# Patient Record
Sex: FEMALE | URBAN - METROPOLITAN AREA
[De-identification: names, ages, dates, MRNs, and addresses within clinical notes are randomized per-mention and may not be internally consistent; named-entity substitution may affect disease eponyms.]

---

## 2020-06-17 ENCOUNTER — CLAIMS CREATED FROM THE CLAIM WINDOW (OUTPATIENT)
Dept: URBAN - METROPOLITAN AREA MEDICAL CENTER 28 | Facility: MEDICAL CENTER | Age: 55
End: 2020-06-17

## 2020-06-17 ENCOUNTER — CLAIMS CREATED FROM THE CLAIM WINDOW (OUTPATIENT)
Dept: URBAN - METROPOLITAN AREA MEDICAL CENTER 28 | Facility: MEDICAL CENTER | Age: 55
End: 2020-06-17
Payer: COMMERCIAL

## 2020-06-17 DIAGNOSIS — K22.8 COLUMNAR-LINED ESOPHAGUS: ICD-10-CM

## 2020-06-17 DIAGNOSIS — K31.89 ACQUIRED DEFORMITY OF PYLORUS: ICD-10-CM

## 2020-06-17 DIAGNOSIS — R10.13 ABDOMINAL DISCOMFORT, EPIGASTRIC: ICD-10-CM

## 2020-06-17 DIAGNOSIS — K29.60 ADENOPAPILLOMATOSIS GASTRICA: ICD-10-CM

## 2020-06-17 PROCEDURE — 43259 EGD US EXAM DUODENUM/JEJUNUM: CPT | Performed by: INTERNAL MEDICINE

## 2020-06-17 PROCEDURE — 43239 EGD BIOPSY SINGLE/MULTIPLE: CPT | Performed by: INTERNAL MEDICINE

## 2020-11-23 ENCOUNTER — TELEPHONE ENCOUNTER (OUTPATIENT)
Dept: URBAN - METROPOLITAN AREA CLINIC 98 | Facility: CLINIC | Age: 55
End: 2020-11-23

## 2021-01-07 ENCOUNTER — OFFICE VISIT (OUTPATIENT)
Dept: URBAN - METROPOLITAN AREA CLINIC 98 | Facility: CLINIC | Age: 56
End: 2021-01-07

## 2021-01-07 VITALS — HEIGHT: 67 IN

## 2021-07-23 ENCOUNTER — WEB ENCOUNTER (OUTPATIENT)
Dept: URBAN - METROPOLITAN AREA CLINIC 98 | Facility: CLINIC | Age: 56
End: 2021-07-23

## 2021-07-23 ENCOUNTER — OFFICE VISIT (OUTPATIENT)
Dept: URBAN - METROPOLITAN AREA CLINIC 98 | Facility: CLINIC | Age: 56
End: 2021-07-23
Payer: COMMERCIAL

## 2021-07-23 VITALS
HEART RATE: 92 BPM | TEMPERATURE: 97.1 F | HEIGHT: 67 IN | DIASTOLIC BLOOD PRESSURE: 75 MMHG | SYSTOLIC BLOOD PRESSURE: 115 MMHG | WEIGHT: 132.8 LBS | BODY MASS INDEX: 20.84 KG/M2

## 2021-07-23 DIAGNOSIS — K21.00 CHRONIC REFLUX ESOPHAGITIS: ICD-10-CM

## 2021-07-23 DIAGNOSIS — Z86.010 PERSONAL HISTORY OF COLONIC POLYPS: ICD-10-CM

## 2021-07-23 PROCEDURE — 99214 OFFICE O/P EST MOD 30 MIN: CPT | Performed by: INTERNAL MEDICINE

## 2021-07-23 RX ORDER — UPADACITINIB 15 MG/1
1 TABLET TABLET, EXTENDED RELEASE ORAL ONCE A DAY
Status: ACTIVE | COMMUNITY

## 2021-07-23 RX ORDER — SODIUM, POTASSIUM,MAG SULFATES 17.5-3.13G
17.5-13.3-1.6 GM/177ML SOLUTION, RECONSTITUTED, ORAL ORAL
Qty: 1 BOX | Refills: 0 | OUTPATIENT
Start: 2021-07-23 | End: 2021-07-24

## 2021-07-23 NOTE — HPI-TODAY'S VISIT:
Last colon in 2018 and was normal. History of colon polyps.  EUS in 6/20 and was normal.  No CIBH or bleeding.  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  No CIBH or bleeding.

## 2021-07-27 PROBLEM — 428283002: Status: ACTIVE | Noted: 2021-07-23

## 2021-09-13 ENCOUNTER — OFFICE VISIT (OUTPATIENT)
Dept: URBAN - METROPOLITAN AREA SURGERY CENTER 18 | Facility: SURGERY CENTER | Age: 56
End: 2021-09-13
Payer: COMMERCIAL

## 2021-09-13 DIAGNOSIS — Z80.0 BROTHER AT YOUNG AGE FAMILY HISTORY OF COLON CANCER: ICD-10-CM

## 2021-09-13 DIAGNOSIS — Z86.010 ADENOMAS PERSONAL HISTORY OF COLONIC POLYPS: ICD-10-CM

## 2021-09-13 PROCEDURE — G8907 PT DOC NO EVENTS ON DISCHARG: HCPCS | Performed by: INTERNAL MEDICINE

## 2021-09-13 PROCEDURE — 45378 DIAGNOSTIC COLONOSCOPY: CPT | Performed by: INTERNAL MEDICINE

## 2021-09-13 RX ORDER — UPADACITINIB 15 MG/1
1 TABLET TABLET, EXTENDED RELEASE ORAL ONCE A DAY
Status: ACTIVE | COMMUNITY

## 2023-06-27 ENCOUNTER — LAB OUTSIDE AN ENCOUNTER (OUTPATIENT)
Dept: URBAN - METROPOLITAN AREA CLINIC 98 | Facility: CLINIC | Age: 58
End: 2023-06-27

## 2023-06-27 ENCOUNTER — OFFICE VISIT (OUTPATIENT)
Dept: URBAN - METROPOLITAN AREA CLINIC 98 | Facility: CLINIC | Age: 58
End: 2023-06-27
Payer: COMMERCIAL

## 2023-06-27 ENCOUNTER — WEB ENCOUNTER (OUTPATIENT)
Dept: URBAN - METROPOLITAN AREA CLINIC 98 | Facility: CLINIC | Age: 58
End: 2023-06-27

## 2023-06-27 VITALS
WEIGHT: 134 LBS | HEIGHT: 67 IN | BODY MASS INDEX: 21.03 KG/M2 | DIASTOLIC BLOOD PRESSURE: 65 MMHG | SYSTOLIC BLOOD PRESSURE: 99 MMHG | TEMPERATURE: 97.8 F | HEART RATE: 85 BPM

## 2023-06-27 DIAGNOSIS — R10.13 EPIGASTRIC PAIN: ICD-10-CM

## 2023-06-27 DIAGNOSIS — Z80.0 FAMILY HISTORY OF COLON CANCER: ICD-10-CM

## 2023-06-27 DIAGNOSIS — Z15.09 BIALLELIC MUTATION OF MSH6 GENE: ICD-10-CM

## 2023-06-27 PROCEDURE — 99214 OFFICE O/P EST MOD 30 MIN: CPT | Performed by: INTERNAL MEDICINE

## 2023-06-27 RX ORDER — POLYETHYLENE GLYCOL 3350, SODIUM CHLORIDE, SODIUM BICARBONATE, POTASSIUM CHLORIDE 420; 11.2; 5.72; 1.48 G/4L; G/4L; G/4L; G/4L
AS DIRECTED POWDER, FOR SOLUTION ORAL ONCE
Qty: 420 GM | Refills: 0 | OUTPATIENT
Start: 2023-06-27 | End: 2023-06-28

## 2023-06-27 NOTE — HPI-TODAY'S VISIT:
Ms. Thalia Biggs is a 56 yo F presenting for followup visit for abdominal pain. Carries MSH6 variant.   Last saw Dr. Hunt 7/23/2021 Having every 1-2 year colonoscopy and every 2-4 year EGD for MSH6 variant and family history of GI cancer. In the last 3 weeks she was having a stomachache with eating. Feels more epigastric pain and fullness.  No nausea or vomiting. These symptoms have starte to improve in the last 2 days.  Intermittently losing voice- going to seen ENT for this.   No known unintentional weight loss.   Has BMs daily or every other day.   Sister- colon cancer- age 50s Father: pancreatic cancer age 83   I reviewed:  9/13/2021 colonoscopy: hemorrhoids 6/17/2020 EUS for epigastric pain, chronic pancreatitis: normal-appearing pancreas, hiatal hernia, erythema in stomach/duodenum- biopsied 6/17/2020 EUS path: mild chronic inflammation

## 2023-07-03 ENCOUNTER — OFFICE VISIT (OUTPATIENT)
Dept: URBAN - METROPOLITAN AREA SURGERY CENTER 18 | Facility: SURGERY CENTER | Age: 58
End: 2023-07-03
Payer: COMMERCIAL

## 2023-07-03 ENCOUNTER — CLAIMS CREATED FROM THE CLAIM WINDOW (OUTPATIENT)
Dept: URBAN - METROPOLITAN AREA CLINIC 4 | Facility: CLINIC | Age: 58
End: 2023-07-03
Payer: COMMERCIAL

## 2023-07-03 DIAGNOSIS — K31.89 OTHER DISEASES OF STOMACH AND DUODENUM: ICD-10-CM

## 2023-07-03 DIAGNOSIS — Z12.11 COLON CANCER SCREENING: ICD-10-CM

## 2023-07-03 DIAGNOSIS — Z80.0 BROTHER AT YOUNG AGE FAMILY HISTORY OF COLON CANCER: ICD-10-CM

## 2023-07-03 DIAGNOSIS — K29.60 ADENOPAPILLOMATOSIS GASTRICA: ICD-10-CM

## 2023-07-03 PROCEDURE — G8907 PT DOC NO EVENTS ON DISCHARG: HCPCS | Performed by: INTERNAL MEDICINE

## 2023-07-03 PROCEDURE — 88342 IMHCHEM/IMCYTCHM 1ST ANTB: CPT | Performed by: PATHOLOGY

## 2023-07-03 PROCEDURE — 45378 DIAGNOSTIC COLONOSCOPY: CPT | Performed by: INTERNAL MEDICINE

## 2023-07-03 PROCEDURE — 88305 TISSUE EXAM BY PATHOLOGIST: CPT | Performed by: PATHOLOGY

## 2023-07-03 PROCEDURE — 43239 EGD BIOPSY SINGLE/MULTIPLE: CPT | Performed by: INTERNAL MEDICINE

## 2023-08-11 ENCOUNTER — OFFICE VISIT (OUTPATIENT)
Dept: URBAN - METROPOLITAN AREA CLINIC 98 | Facility: CLINIC | Age: 58
End: 2023-08-11

## 2023-08-24 PROBLEM — 151271000119102: Status: ACTIVE | Noted: 2023-08-24

## 2023-08-24 PROBLEM — 707724006: Status: ACTIVE | Noted: 2023-08-24

## 2023-08-24 PROBLEM — 443913008: Status: ACTIVE | Noted: 2023-08-24

## 2023-08-24 PROBLEM — 69896004: Status: ACTIVE | Noted: 2023-08-24

## 2023-08-24 PROBLEM — 453861000124107: Status: ACTIVE | Noted: 2023-08-24

## 2023-08-25 ENCOUNTER — OFFICE VISIT (OUTPATIENT)
Dept: URBAN - METROPOLITAN AREA CLINIC 86 | Facility: CLINIC | Age: 58
End: 2023-08-25
Payer: COMMERCIAL

## 2023-08-25 VITALS
HEART RATE: 90 BPM | BODY MASS INDEX: 21.35 KG/M2 | WEIGHT: 136 LBS | DIASTOLIC BLOOD PRESSURE: 62 MMHG | TEMPERATURE: 98.2 F | SYSTOLIC BLOOD PRESSURE: 114 MMHG | HEIGHT: 67 IN

## 2023-08-25 DIAGNOSIS — R76.9 ABNORMAL IMMUNOLOGICAL FINDING IN SERUM: ICD-10-CM

## 2023-08-25 DIAGNOSIS — Z71.85 VACCINE COUNSELING: ICD-10-CM

## 2023-08-25 DIAGNOSIS — Z79.899 HIGH RISK MEDICATION USE: ICD-10-CM

## 2023-08-25 DIAGNOSIS — M06.9 RHEUMATOID ARTHRITIS, INVOLVING UNSPECIFIED SITE, UNSPECIFIED WHETHER RHEUMATOID FACTOR PRESENT: ICD-10-CM

## 2023-08-25 DIAGNOSIS — R74.8 ELEVATED LIVER ENZYMES: ICD-10-CM

## 2023-08-25 PROCEDURE — 99204 OFFICE O/P NEW MOD 45 MIN: CPT | Performed by: PHYSICIAN ASSISTANT

## 2023-08-25 RX ORDER — RITUXIMAB 10 MG/ML
AS DIRECTED INJECTION, SOLUTION INTRAVENOUS
Status: ACTIVE | COMMUNITY

## 2023-08-25 RX ORDER — PREDNISOLONE 5 MG/1
1 TABLET IN THE MORNING WITH FOOD OR MILK TABLET ORAL ONCE A DAY
Status: ACTIVE | COMMUNITY

## 2023-08-25 NOTE — HPI-TODAY'S VISIT:
This is a 38-year-old female referred by Dr. Zamora for evaluation of elevated liver enzymes. A ocpy of the note will be sent to the referring provider  She is seeing Dr. Lujan for treatment of her rheumatoid arthritis.  She started Cimzia in 2022.  Now with AMA ASMA positive and elevated liver enzymes. She is also on steriods. previously has tried methotrexate, plaquenil, xeljanz, ocrencia, enbrel and humira  Most recent July 21 labs with bilirubin 0.3, alkaline phosphatase 67, AST 20, ALT 14.  Creatinine 0.71.  White blood cells 3.5, hemoglobin 11.4, platelets 278.  Previous in jan 2023 bili 0.2, alp 58, ast 29, alt  They were concerned with her medications causing issues.  Certolizumab has been associated with a low rate of serum aminotransferase elevations during therapy, similar to the rate found with placebo therapy. The ALT elevations have been transient, mild and asymptomatic, and have rarely required dose modification. Certolizumab has been available for a relatively short period of time, and case reports of clinically apparent hepatic injury due to its use have not been published. Nevertheless, it is likely that certolizumab, like infliximab and adalimumab, is capable of inducing clinically apparent liver injury that resembles autoimmune hepatitis, which generally arises after at least 3 months of use and is associated with a hepatocellular pattern of serum enzyme elevation and autoantibody formation. Autoimmune hepatitis induced by anti-TNF blocking agents can be severe and self-sustained and require corticosteroid therapy.  Certolizumab, like other TNF antagonists, can also be expected to cause reactivation of chronic hepatitis B. Reactivation typically occurs in patients who are inactive HBsAg carriers, with normal serum aminotransferase levels and no or only low levels of HBV DNA in serum. The immune suppression caused by the immunomodulatory agent leads to an increase in HBV replication and rise in serum HBV DNA levels. With stopping the immunosuppression (or between cycles of therapy), restoration of immune function leads to an acute immunological response to the heightened viral replication and a flare of hepatitis, that can be severe and can result in hepatic failure and death. Reactivation in patients with anti-HBc without HBsAg (serologic pattern of previous HBV infection) has been reported only rarely in patients treated with anti-TNF antagonists, and is more common after therapy with rituximab and bone marrow transplantation. The anti-TNF inhibitors have little or no effect on hepatitis C virus levels and have been used safely in patients with chronic hepatitis C.

## 2023-09-06 ENCOUNTER — OFFICE VISIT (OUTPATIENT)
Dept: URBAN - METROPOLITAN AREA CLINIC 79 | Facility: CLINIC | Age: 58
End: 2023-09-06

## 2023-09-08 ENCOUNTER — OFFICE VISIT (OUTPATIENT)
Dept: URBAN - METROPOLITAN AREA CLINIC 91 | Facility: CLINIC | Age: 58
End: 2023-09-08
Payer: COMMERCIAL

## 2023-09-08 DIAGNOSIS — R93.2 ABNORMAL ULTRASOUND OF LIVER: ICD-10-CM

## 2023-09-08 PROCEDURE — 76705 ECHO EXAM OF ABDOMEN: CPT

## 2023-09-08 PROCEDURE — 93975 VASCULAR STUDY: CPT

## 2023-09-11 ENCOUNTER — TELEPHONE ENCOUNTER (OUTPATIENT)
Dept: URBAN - METROPOLITAN AREA CLINIC 86 | Facility: CLINIC | Age: 58
End: 2023-09-11

## 2023-09-11 ENCOUNTER — LAB OUTSIDE AN ENCOUNTER (OUTPATIENT)
Dept: URBAN - METROPOLITAN AREA CLINIC 86 | Facility: CLINIC | Age: 58
End: 2023-09-11

## 2023-09-11 NOTE — HPI-TODAY'S VISIT:
Dear Chandrika Biggs,  The 9/8/23 ultrasound was sent to me.  The study did not see any gallstones.  Gallbladder normal no inflammation.  The common duct 3 mm.  The liver was mildly coarsened in echotexture but no focal abnormality or lesions seen.  Right kidney appears normal.  The spleen 6 cm and normal.  Hepatic vascular patent.  Overall they thought that the liver appeared somewhat fatty and this is likely due to your history of intermittently elevated liver enzymes and being on the steroids.  Treatment for this is diet and exercise.  We also may want to take a closer look at this given your BMI it is very normal and you do exercise and practice a healthy diet. We can proceed with an MRI to evaluate this. I can order one through [employer]. They should contact you to schedule this. If there are issues please contact our office at [phone] extension [phone] for my medical assistant, Betito.  Lulu Castro PA-C

## 2023-10-06 ENCOUNTER — LAB OUTSIDE AN ENCOUNTER (OUTPATIENT)
Dept: URBAN - METROPOLITAN AREA CLINIC 92 | Facility: CLINIC | Age: 58
End: 2023-10-06

## 2023-10-06 ENCOUNTER — WEB ENCOUNTER (OUTPATIENT)
Dept: URBAN - METROPOLITAN AREA CLINIC 86 | Facility: CLINIC | Age: 58
End: 2023-10-06

## 2023-10-06 ENCOUNTER — OFFICE VISIT (OUTPATIENT)
Dept: URBAN - METROPOLITAN AREA CLINIC 86 | Facility: CLINIC | Age: 58
End: 2023-10-06
Payer: COMMERCIAL

## 2023-10-06 DIAGNOSIS — R76.9 ABNORMAL IMMUNOLOGICAL FINDING IN SERUM: ICD-10-CM

## 2023-10-06 DIAGNOSIS — Z79.899 HIGH RISK MEDICATION USE: ICD-10-CM

## 2023-10-06 DIAGNOSIS — Z71.85 VACCINE COUNSELING: ICD-10-CM

## 2023-10-06 DIAGNOSIS — R74.8 ELEVATED LIVER ENZYMES: ICD-10-CM

## 2023-10-06 DIAGNOSIS — M06.9 RHEUMATOID ARTHRITIS, INVOLVING UNSPECIFIED SITE, UNSPECIFIED WHETHER RHEUMATOID FACTOR PRESENT: ICD-10-CM

## 2023-10-06 PROCEDURE — 99214 OFFICE O/P EST MOD 30 MIN: CPT | Performed by: PHYSICIAN ASSISTANT

## 2023-10-06 RX ORDER — RITUXIMAB 10 MG/ML
AS DIRECTED INJECTION, SOLUTION INTRAVENOUS
Status: ACTIVE | COMMUNITY

## 2023-10-06 RX ORDER — PREDNISOLONE 5 MG/1
1 TABLET IN THE MORNING WITH FOOD OR MILK TABLET ORAL ONCE A DAY
Status: ACTIVE | COMMUNITY

## 2023-10-06 NOTE — HPI-TODAY'S VISIT:
This is a 38-year-old female referred by Dr. Zamora for evaluation of elevated liver enzymes. A ocpy of the note will be sent to the referring provider    10/6/23 she did MRI EXAM: MR ABDOMEN WITH AND WITHOUT CONTRAST  CLINICAL INDICATION: Epigastric pain, elevated liver enzymes, abnormal ultrasound of abdomen.  TECHNIQUE: Multiplanar multisequence images were obtained through the abdomen without and with IV administration of 14 mL of Clariscan. Fat quantification was also performed.  COMPARISON: The report from an outside ultrasound examination dated September 8, 2023 is available. The images from the outside examination are not available at the time of interpretation.  FINDINGS:  Evaluation of the visualized lower chest demonstrates the heart is within normal limits in size. There is no significant sized pleural or pericardial effusion at the visualized lung bases. Please note the lungs are suboptimally evaluated with MRI.  The liver is within normal limits in size and the right hepatic lobe measures 12.5 cm in length. The liver demonstrates a smooth contour. There is no significant hepatic steatosis. There is no suspicious enhancing hepatic mass.  Fat quantification: The average liver fat content percentage calculated using a two-point Stevens technique is: 3 %, which is not compatible with fatty liver.  Reference values: =6% normal (Grade 0) > 6 to = 26% mild steatosis (Grade 1) > 27 to = 37% moderate steatosis (Grade 2) > 37% severe steatosis (Grade 3)   The gallbladder demonstrates T1 hyperintense material likely sludge and/or concentrated bile. There are no discrete filling defects to suggest gallstones. There is no intra or extrahepatic biliary dilatation.  The spleen is within normal limits in size and is unremarkable. The adrenal glands are unremarkable.  ----- Page Break ----- The pancreas is unremarkable. T1 signal intensity of the pancreas is within normal limits. There is no dilatation of the main pancreatic duct. No focal abnormality is identified within the pancreas. There is no pancreatic or peripancreatic fluid collection.  The kidneys enhance symmetrically. There is no hydronephrosis. There is a small T2 hyperintense cyst at the lower pole the right kidney measuring 0.7 cm (series 4 image 24) with a probable thin internal septation best seen on coronal post contrast images (series 24 image 34) likely a mildly complex Bosniak 2 renal cyst otherwise without definite postcontrast enhancement.  The abdominal aorta is patent without aneurysmal dilatation. The celiac, SMA and inferior mesenteric arteries are patent. The hepatic veins and portal veins are patent.  There is no evidence for mechanical intestinal obstruction of the visualized bowel. The bowel is not imaged in its entirety.  There is no abdominal ascites. There is no abdominal lymphadenopathy by size criteria.  No suspicious osseous lesion is identified.  IMPRESSION: 1. No significant hepatic steatosis. No suspicious enhancing hepatic mass is identified. 2. Subcentimeter right lower pole renal cyst most likely a Bosniak 2 renal cyst. No hydronephrosis. 3. No MRI evidence for acute pancreatitis or acute cholecystitis.  The 9/8/23 ultrasound was sent to me.  The study did not see any gallstones.  Gallbladder normal no inflammation.  The common duct 3 mm.  The liver was mildly coarsened in echotexture but no focal abnormality or lesions seen.  Right kidney appears normal.  The spleen 6 cm and normal.  Hepatic vascular patent.  Overall they thought that the liver appeared somewhat fatty and this is likely due to your history of intermittently elevated liver enzymes and being on the steroids.  Treatment for this is diet and exercise.  We also may want to take a closer look at this given your BMI it is very normal and you do exercise and practice a healthy diet. We can proceed with an MRI to evaluate this. I can order one through [email or web address]. They should contact you to schedule this. If there are issues please contact our office at [phone] extension [phone] for my medical assistant, Betito.  she did not do the labs so we will do the labs today  recap She is seeing Dr. Lujan for treatment of her rheumatoid arthritis.  She started Cimzia in 2022.  Now with AMA ASMA positive and elevated liver enzymes. She is also on steriods. previously has tried methotrexate, plaquenil, xeljanz, ocrencia, enbrel and humira  Most recent July 21 labs with bilirubin 0.3, alkaline phosphatase 67, AST 20, ALT 14.  Creatinine 0.71.  White blood cells 3.5, hemoglobin 11.4, platelets 278.  Previous in jan 2023 bili 0.2, alp 58, ast 29, alt  They were concerned with her medications causing issues.  Certolizumab has been associated with a low rate of serum aminotransferase elevations during therapy, similar to the rate found with placebo therapy. The ALT elevations have been transient, mild and asymptomatic, and have rarely required dose modification. Certolizumab has been available for a relatively short period of time, and case reports of clinically apparent hepatic injury due to its use have not been published. Nevertheless, it is likely that certolizumab, like infliximab and adalimumab, is capable of inducing clinically apparent liver injury that resembles autoimmune hepatitis, which generally arises after at least 3 months of use and is associated with a hepatocellular pattern of serum enzyme elevation and autoantibody formation. Autoimmune hepatitis induced by anti-TNF blocking agents can be severe and self-sustained and require corticosteroid therapy.  Certolizumab, like other TNF antagonists, can also be expected to cause reactivation of chronic hepatitis B. Reactivation typically occurs in patients who are inactive HBsAg carriers, with normal serum aminotransferase levels and no or only low levels of HBV DNA in serum. The immune suppression caused by the immunomodulatory agent leads to an increase in HBV replication and rise in serum HBV DNA levels. With stopping the immunosuppression (or between cycles of therapy), restoration of immune function leads to an acute immunological response to the heightened viral replication and a flare of hepatitis, that can be severe and can result in hepatic failure and death. Reactivation in patients with anti-HBc without HBsAg (serologic pattern of previous HBV infection) has been reported only rarely in patients treated with anti-TNF antagonists, and is more common after therapy with rituximab and bone marrow transplantation. The anti-TNF inhibitors have little or no effect on hepatitis C virus levels and have been used safely in patients with chronic hepatitis C.

## 2023-10-15 LAB
A/G RATIO: 0.7
ALBUMIN: 3.6
ALKALINE PHOSPHATASE: 63
ALPHA-1-ANTITRYPSIN, SERUM: 138
ALT (SGPT): 14
AST (SGOT): 23
BASO (ABSOLUTE): 0.1
BASOS: 1
BILIRUBIN, TOTAL: <0.2
BUN/CREATININE RATIO: 24
BUN: 17
CALCIUM: 9.6
CARBON DIOXIDE, TOTAL: 24
CHLORIDE: 103
CREATININE: 0.71
EGFR: 98
EOS (ABSOLUTE): 0.1
EOS: 3
FERRITIN, SERUM: 33
GLOBULIN, TOTAL: 5.4
GLUCOSE: 60
HBSAG SCREEN: NEGATIVE
HCV AB: NON REACTIVE
HEMATOCRIT: 37.6
HEMATOLOGY COMMENTS:: (no result)
HEMOGLOBIN: 11.8
HEP A AB, TOTAL: POSITIVE
HEP B CORE AB, TOT: NEGATIVE
HEPATITIS B SURF AB QUANT: <3.1
IMMATURE CELLS: (no result)
IMMATURE GRANS (ABS): 0
IMMATURE GRANULOCYTES: 0
IMMUNOGLOBULIN A, QN, SERUM: 988
IMMUNOGLOBULIN E, TOTAL: 101
IMMUNOGLOBULIN G, QN, SERUM: 3885
IMMUNOGLOBULIN M, QN, SERUM: 98
INTERPRETATION:: (no result)
IRON BIND.CAP.(TIBC): 297
IRON SATURATION: 22
IRON: 66
LYMPHS (ABSOLUTE): 1.3
LYMPHS: 32
MCH: 29.4
MCHC: 31.4
MCV: 94
MONOCYTES(ABSOLUTE): 0.3
MONOCYTES: 7
NEUTROPHILS (ABSOLUTE): 2.3
NEUTROPHILS: 57
NRBC: (no result)
PHENOTYPE (PI): (no result)
PLATELETS: 259
POTASSIUM: 4
PROTEIN, TOTAL: 9
RBC: 4.02
RDW: 12.9
SODIUM: 141
UIBC: 231
WBC: 4

## 2023-10-18 ENCOUNTER — TELEPHONE ENCOUNTER (OUTPATIENT)
Dept: URBAN - METROPOLITAN AREA CLINIC 86 | Facility: CLINIC | Age: 58
End: 2023-10-18

## 2023-10-18 NOTE — HPI-TODAY'S VISIT:
Dear Chandrika Biggs,   The 10/6/23 labs sent to me. The immunoglobulin g is elevated at 3885, and I suspect this is due to your other autoimmune issues. The iron studies are normal. You are not immune to hepatitis b and recommend discussing this vaccine with your primary care. The Alpha 1 antitrypsin phenotype is MM and this is normal. The total protein is elevated and I would recommend sharing this with the primary care provider. The alkaline phosphatase 63, ast 23, alt 14. The liver enzymes are normal. The complete blood count overall is normal. There is no evidence of hepatitis b. You are immune to hepatitis A. There is no evidence of hepatitis c. If you recall we were checking these to rule out other causes for your history of elevated liver enzymes and at this time the liver enzymes are normal. We will review at the next visit.   Lulu Castro PA-C

## 2023-11-03 ENCOUNTER — OFFICE VISIT (OUTPATIENT)
Dept: URBAN - METROPOLITAN AREA CLINIC 86 | Facility: CLINIC | Age: 58
End: 2023-11-03

## 2023-11-03 RX ORDER — RITUXIMAB 10 MG/ML
AS DIRECTED INJECTION, SOLUTION INTRAVENOUS
Status: ACTIVE | COMMUNITY

## 2023-11-03 RX ORDER — PREDNISOLONE 5 MG/1
1 TABLET IN THE MORNING WITH FOOD OR MILK TABLET ORAL ONCE A DAY
Status: ACTIVE | COMMUNITY

## 2023-11-03 NOTE — HPI-TODAY'S VISIT:
Dear Chandrika Biggs,  The 10/6/23 labs sent to me. The immunoglobulin g is elevated at 3885, and I suspect this is due to your other autoimmune issues. The iron studies are normal. You are not immune to hepatitis b and recommend discussing this vaccine with your primary care. The Alpha 1 antitrypsin phenotype is MM and this is normal. The total protein is elevated and I would recommend sharing this with the primary care provider. The alkaline phosphatase 63, ast 23, alt 14. The liver enzymes are normal. The complete blood count overall is normal. There is no evidence of hepatitis b. You are immune to hepatitis A. There is no evidence of hepatitis c. If you recall we were checking these to rule out other causes for your history of elevated liver enzymes and at this time the liver enzymes are normal. We will review at the next visit.  Lulu Castro PA-C This is a 38-year-old female referred by Dr. Zamora for evaluation of elevated liver enzymes. A ocpy of the note will be sent to the referring provider    10/6/23 she did MRI EXAM: MR ABDOMEN WITH AND WITHOUT CONTRAST  CLINICAL INDICATION: Epigastric pain, elevated liver enzymes, abnormal ultrasound of abdomen.  TECHNIQUE: Multiplanar multisequence images were obtained through the abdomen without and with IV administration of 14 mL of Clariscan. Fat quantification was also performed.  COMPARISON: The report from an outside ultrasound examination dated September 8, 2023 is available. The images from the outside examination are not available at the time of interpretation.  FINDINGS:  Evaluation of the visualized lower chest demonstrates the heart is within normal limits in size. There is no significant sized pleural or pericardial effusion at the visualized lung bases. Please note the lungs are suboptimally evaluated with MRI.  The liver is within normal limits in size and the right hepatic lobe measures 12.5 cm in length. The liver demonstrates a smooth contour. There is no significant hepatic steatosis. There is no suspicious enhancing hepatic mass.  Fat quantification: The average liver fat content percentage calculated using a two-point Stevens technique is: 3 %, which is not compatible with fatty liver.  Reference values: =6% normal (Grade 0) > 6 to = 26% mild steatosis (Grade 1) > 27 to = 37% moderate steatosis (Grade 2) > 37% severe steatosis (Grade 3)   The gallbladder demonstrates T1 hyperintense material likely sludge and/or concentrated bile. There are no discrete filling defects to suggest gallstones. There is no intra or extrahepatic biliary dilatation.  The spleen is within normal limits in size and is unremarkable. The adrenal glands are unremarkable.  ----- Page Break ----- The pancreas is unremarkable. T1 signal intensity of the pancreas is within normal limits. There is no dilatation of the main pancreatic duct. No focal abnormality is identified within the pancreas. There is no pancreatic or peripancreatic fluid collection.  The kidneys enhance symmetrically. There is no hydronephrosis. There is a small T2 hyperintense cyst at the lower pole the right kidney measuring 0.7 cm (series 4 image 24) with a probable thin internal septation best seen on coronal post contrast images (series 24 image 34) likely a mildly complex Bosniak 2 renal cyst otherwise without definite postcontrast enhancement.  The abdominal aorta is patent without aneurysmal dilatation. The celiac, SMA and inferior mesenteric arteries are patent. The hepatic veins and portal veins are patent.  There is no evidence for mechanical intestinal obstruction of the visualized bowel. The bowel is not imaged in its entirety.  There is no abdominal ascites. There is no abdominal lymphadenopathy by size criteria.  No suspicious osseous lesion is identified.  IMPRESSION: 1. No significant hepatic steatosis. No suspicious enhancing hepatic mass is identified. 2. Subcentimeter right lower pole renal cyst most likely a Bosniak 2 renal cyst. No hydronephrosis. 3. No MRI evidence for acute pancreatitis or acute cholecystitis.  The 9/8/23 ultrasound was sent to me.  The study did not see any gallstones.  Gallbladder normal no inflammation.  The common duct 3 mm.  The liver was mildly coarsened in echotexture but no focal abnormality or lesions seen.  Right kidney appears normal.  The spleen 6 cm and normal.  Hepatic vascular patent.  Overall they thought that the liver appeared somewhat fatty and this is likely due to your history of intermittently elevated liver enzymes and being on the steroids.  Treatment for this is diet and exercise.  We also may want to take a closer look at this given your BMI it is very normal and you do exercise and practice a healthy diet. We can proceed with an MRI to evaluate this. I can order one through [employer]. They should contact you to schedule this. If there are issues please contact our office at [phone] extension [phone] for my medical assistant, Betito.  she did not do the labs so we will do the labs today  recap She is seeing Dr. Lujan for treatment of her rheumatoid arthritis.  She started Cimzia in 2022.  Now with AMA ASMA positive and elevated liver enzymes. She is also on steriods. previously has tried methotrexate, plaquenil, xeljanz, ocrencia, enbrel and humira  Most recent July 21 labs with bilirubin 0.3, alkaline phosphatase 67, AST 20, ALT 14.  Creatinine 0.71.  White blood cells 3.5, hemoglobin 11.4, platelets 278.  Previous in jan 2023 bili 0.2, alp 58, ast 29, alt  They were concerned with her medications causing issues.  Certolizumab has been associated with a low rate of serum aminotransferase elevations during therapy, similar to the rate found with placebo therapy. The ALT elevations have been transient, mild and asymptomatic, and have rarely required dose modification. Certolizumab has been available for a relatively short period of time, and case reports of clinically apparent hepatic injury due to its use have not been published. Nevertheless, it is likely that certolizumab, like infliximab and adalimumab, is capable of inducing clinically apparent liver injury that resembles autoimmune hepatitis, which generally arises after at least 3 months of use and is associated with a hepatocellular pattern of serum enzyme elevation and autoantibody formation. Autoimmune hepatitis induced by anti-TNF blocking agents can be severe and self-sustained and require corticosteroid therapy.  Certolizumab, like other TNF antagonists, can also be expected to cause reactivation of chronic hepatitis B. Reactivation typically occurs in patients who are inactive HBsAg carriers, with normal serum aminotransferase levels and no or only low levels of HBV DNA in serum. The immune suppression caused by the immunomodulatory agent leads to an increase in HBV replication and rise in serum HBV DNA levels. With stopping the immunosuppression (or between cycles of therapy), restoration of immune function leads to an acute immunological response to the heightened viral replication and a flare of hepatitis, that can be severe and can result in hepatic failure and death. Reactivation in patients with anti-HBc without HBsAg (serologic pattern of previous HBV infection) has been reported only rarely in patients treated with anti-TNF antagonists, and is more common after therapy with rituximab and bone marrow transplantation. The anti-TNF inhibitors have little or no effect on hepatitis C virus levels and have been used safely in patients with chronic hepatitis C.

## 2023-11-10 ENCOUNTER — OFFICE VISIT (OUTPATIENT)
Dept: URBAN - METROPOLITAN AREA CLINIC 86 | Facility: CLINIC | Age: 58
End: 2023-11-10
Payer: COMMERCIAL

## 2023-11-10 VITALS
SYSTOLIC BLOOD PRESSURE: 105 MMHG | HEIGHT: 67 IN | HEART RATE: 80 BPM | WEIGHT: 134.5 LBS | TEMPERATURE: 97 F | DIASTOLIC BLOOD PRESSURE: 66 MMHG | BODY MASS INDEX: 21.11 KG/M2

## 2023-11-10 DIAGNOSIS — R76.9 ABNORMAL IMMUNOLOGICAL FINDING IN SERUM: ICD-10-CM

## 2023-11-10 DIAGNOSIS — R74.8 ELEVATED LIVER ENZYMES: ICD-10-CM

## 2023-11-10 DIAGNOSIS — M06.9 RHEUMATOID ARTHRITIS, INVOLVING UNSPECIFIED SITE, UNSPECIFIED WHETHER RHEUMATOID FACTOR PRESENT: ICD-10-CM

## 2023-11-10 DIAGNOSIS — Z79.899 HIGH RISK MEDICATION USE: ICD-10-CM

## 2023-11-10 DIAGNOSIS — Z71.85 VACCINE COUNSELING: ICD-10-CM

## 2023-11-10 PROCEDURE — 99214 OFFICE O/P EST MOD 30 MIN: CPT | Performed by: PHYSICIAN ASSISTANT

## 2023-11-10 RX ORDER — PREDNISOLONE 5 MG/1
1 TABLET IN THE MORNING WITH FOOD OR MILK TABLET ORAL ONCE A DAY
Status: ACTIVE | COMMUNITY

## 2023-11-10 RX ORDER — RITUXIMAB 10 MG/ML
AS DIRECTED INJECTION, SOLUTION INTRAVENOUS
Status: ACTIVE | COMMUNITY

## 2023-11-10 NOTE — HPI-TODAY'S VISIT:
This is a 38-year-old female referred by Dr. Zamora for evaluation of elevated liver enzymes. A ocpy of the note will be sent to the referring provider  Dear Chandrika Biggs,  The 10/6/23 labs sent to me. The immunoglobulin g is elevated at 3885, and I suspect this is due to your other autoimmune issues. The iron studies are normal. You are not immune to hepatitis b and recommend discussing this vaccine with your primary care. The Alpha 1 antitrypsin phenotype is MM and this is normal. The total protein is elevated and I would recommend sharing this with the primary care provider. The alkaline phosphatase 63, ast 23, alt 14. The liver enzymes are normal. The complete blood count overall is normal. There is no evidence of hepatitis b. You are immune to hepatitis A. There is no evidence of hepatitis c. If you recall we were checking these to rule out other causes for your history of elevated liver enzymes and at this time the liver enzymes are normal. We will review at the next visit.  Lulu Castro PA-C    10/6/23 she did MRI EXAM: MR ABDOMEN WITH AND WITHOUT CONTRAST  CLINICAL INDICATION: Epigastric pain, elevated liver enzymes, abnormal ultrasound of abdomen.  TECHNIQUE: Multiplanar multisequence images were obtained through the abdomen without and with IV administration of 14 mL of Clariscan. Fat quantification was also performed.  COMPARISON: The report from an outside ultrasound examination dated September 8, 2023 is available. The images from the outside examination are not available at the time of interpretation.  FINDINGS:  Evaluation of the visualized lower chest demonstrates the heart is within normal limits in size. There is no significant sized pleural or pericardial effusion at the visualized lung bases. Please note the lungs are suboptimally evaluated with MRI.  The liver is within normal limits in size and the right hepatic lobe measures 12.5 cm in length. The liver demonstrates a smooth contour. There is no significant hepatic steatosis. There is no suspicious enhancing hepatic mass.  Fat quantification: The average liver fat content percentage calculated using a two-point Stevens technique is: 3 %, which is not compatible with fatty liver.  Reference values: =6% normal (Grade 0) > 6 to = 26% mild steatosis (Grade 1) > 27 to = 37% moderate steatosis (Grade 2) > 37% severe steatosis (Grade 3)   The gallbladder demonstrates T1 hyperintense material likely sludge and/or concentrated bile. There are no discrete filling defects to suggest gallstones. There is no intra or extrahepatic biliary dilatation.  The spleen is within normal limits in size and is unremarkable. The adrenal glands are unremarkable.  ----- Page Break ----- The pancreas is unremarkable. T1 signal intensity of the pancreas is within normal limits. There is no dilatation of the main pancreatic duct. No focal abnormality is identified within the pancreas. There is no pancreatic or peripancreatic fluid collection.  The kidneys enhance symmetrically. There is no hydronephrosis. There is a small T2 hyperintense cyst at the lower pole the right kidney measuring 0.7 cm (series 4 image 24) with a probable thin internal septation best seen on coronal post contrast images (series 24 image 34) likely a mildly complex Bosniak 2 renal cyst otherwise without definite postcontrast enhancement.  The abdominal aorta is patent without aneurysmal dilatation. The celiac, SMA and inferior mesenteric arteries are patent. The hepatic veins and portal veins are patent.  There is no evidence for mechanical intestinal obstruction of the visualized bowel. The bowel is not imaged in its entirety.  There is no abdominal ascites. There is no abdominal lymphadenopathy by size criteria.  No suspicious osseous lesion is identified.  IMPRESSION: 1. No significant hepatic steatosis. No suspicious enhancing hepatic mass is identified. 2. Subcentimeter right lower pole renal cyst most likely a Bosniak 2 renal cyst. No hydronephrosis. 3. No MRI evidence for acute pancreatitis or acute cholecystitis.  The 9/8/23 ultrasound was sent to me.  The study did not see any gallstones.  Gallbladder normal no inflammation.  The common duct 3 mm.  The liver was mildly coarsened in echotexture but no focal abnormality or lesions seen.  Right kidney appears normal.  The spleen 6 cm and normal.  Hepatic vascular patent.  Overall they thought that the liver appeared somewhat fatty and this is likely due to your history of intermittently elevated liver enzymes and being on the steroids.  Treatment for this is diet and exercise.  We also may want to take a closer look at this given your BMI it is very normal and you do exercise and practice a healthy diet. We can proceed with an MRI to evaluate this. I can order one through [facility]. They should contact you to schedule this. If there are issues please contact our office at [phone] extension [phone] for my medical assistant, Betito.  she did not do the labs so we will do the labs today  recap She is seeing Dr. Lujan for treatment of her rheumatoid arthritis.  She started Cimzia in 2022.  Now with AMA ASMA positive and elevated liver enzymes. She is also on steriods. previously has tried methotrexate, plaquenil, xeljanz, ocrencia, enbrel and humira  Most recent July 21 labs with bilirubin 0.3, alkaline phosphatase 67, AST 20, ALT 14.  Creatinine 0.71.  White blood cells 3.5, hemoglobin 11.4, platelets 278.  Previous in jan 2023 bili 0.2, alp 58, ast 29, alt  They were concerned with her medications causing issues.  Certolizumab has been associated with a low rate of serum aminotransferase elevations during therapy, similar to the rate found with placebo therapy. The ALT elevations have been transient, mild and asymptomatic, and have rarely required dose modification. Certolizumab has been available for a relatively short period of time, and case reports of clinically apparent hepatic injury due to its use have not been published. Nevertheless, it is likely that certolizumab, like infliximab and adalimumab, is capable of inducing clinically apparent liver injury that resembles autoimmune hepatitis, which generally arises after at least 3 months of use and is associated with a hepatocellular pattern of serum enzyme elevation and autoantibody formation. Autoimmune hepatitis induced by anti-TNF blocking agents can be severe and self-sustained and require corticosteroid therapy.  Certolizumab, like other TNF antagonists, can also be expected to cause reactivation of chronic hepatitis B. Reactivation typically occurs in patients who are inactive HBsAg carriers, with normal serum aminotransferase levels and no or only low levels of HBV DNA in serum. The immune suppression caused by the immunomodulatory agent leads to an increase in HBV replication and rise in serum HBV DNA levels. With stopping the immunosuppression (or between cycles of therapy), restoration of immune function leads to an acute immunological response to the heightened viral replication and a flare of hepatitis, that can be severe and can result in hepatic failure and death. Reactivation in patients with anti-HBc without HBsAg (serologic pattern of previous HBV infection) has been reported only rarely in patients treated with anti-TNF antagonists, and is more common after therapy with rituximab and bone marrow transplantation. The anti-TNF inhibitors have little or no effect on hepatitis C virus levels and have been used safely in patients with chronic hepatitis C.

## 2024-04-19 ENCOUNTER — OV EP (OUTPATIENT)
Dept: URBAN - METROPOLITAN AREA CLINIC 98 | Facility: CLINIC | Age: 59
End: 2024-04-19
Payer: COMMERCIAL

## 2024-04-19 VITALS
HEIGHT: 67 IN | BODY MASS INDEX: 21.5 KG/M2 | WEIGHT: 137 LBS | DIASTOLIC BLOOD PRESSURE: 60 MMHG | HEART RATE: 82 BPM | SYSTOLIC BLOOD PRESSURE: 94 MMHG | TEMPERATURE: 97.2 F

## 2024-04-19 DIAGNOSIS — R14.0 ABDOMINAL BLOATING: ICD-10-CM

## 2024-04-19 DIAGNOSIS — R14.3 EXCESSIVE GAS: ICD-10-CM

## 2024-04-19 DIAGNOSIS — Z80.0 FAMILY HISTORY OF COLON CANCER: ICD-10-CM

## 2024-04-19 PROCEDURE — 99214 OFFICE O/P EST MOD 30 MIN: CPT | Performed by: INTERNAL MEDICINE

## 2024-04-19 RX ORDER — PREDNISOLONE 5 MG/1
1 TABLET IN THE MORNING WITH FOOD OR MILK TABLET ORAL ONCE A DAY
Status: ACTIVE | COMMUNITY

## 2024-04-19 NOTE — HPI-TODAY'S VISIT:
Ms. Thalia Biggs is a 57 yo F presenting for followup visit for abdominal pain. Carries MSH6 variant. Last visit on 6/27/23 with me.   Had Mexican food at a restaurant recently and then had a lot of gas and some bloating.  No vomiting or nausea.  No blood in stools. Had 1 small dark, solid stool once taht has now resolved.  Also changed diet to more fiber around this time.  Not having abdominal pain at this time.   6/27/23:   Having every 1-2 year colonoscopy and every 2-4 year EGD for MSH6 variant and family history of GI cancer.  No known unintentional weight loss.   Has BMs daily or every other day.   Sister- colon cancer- age 50s Father: pancreatic cancer age 83   I reviewed:  7/3/23 EGD: normal- mild gastritis 7/3/23 colonoscopy: normal- next due in  9/13/2021 colonoscopy: hemorrhoids 6/17/2020 EUS for epigastric pain, chronic pancreatitis: normal-appearing pancreas, hiatal hernia, erythema in stomach/duodenum- biopsied 6/17/2020 EUS path: mild chronic inflammation

## 2024-06-28 ENCOUNTER — OFFICE VISIT (OUTPATIENT)
Dept: URBAN - METROPOLITAN AREA CLINIC 98 | Facility: CLINIC | Age: 59
End: 2024-06-28
Payer: COMMERCIAL

## 2024-06-28 ENCOUNTER — DASHBOARD ENCOUNTERS (OUTPATIENT)
Age: 59
End: 2024-06-28

## 2024-06-28 ENCOUNTER — LAB OUTSIDE AN ENCOUNTER (OUTPATIENT)
Dept: URBAN - METROPOLITAN AREA CLINIC 98 | Facility: CLINIC | Age: 59
End: 2024-06-28

## 2024-06-28 VITALS
WEIGHT: 138.8 LBS | SYSTOLIC BLOOD PRESSURE: 125 MMHG | HEIGHT: 67 IN | TEMPERATURE: 97.1 F | HEART RATE: 85 BPM | BODY MASS INDEX: 21.79 KG/M2 | DIASTOLIC BLOOD PRESSURE: 84 MMHG

## 2024-06-28 DIAGNOSIS — R14.0 ABDOMINAL BLOATING: ICD-10-CM

## 2024-06-28 DIAGNOSIS — R14.3 EXCESSIVE GAS: ICD-10-CM

## 2024-06-28 DIAGNOSIS — K58.0 IRRITABLE BOWEL SYNDROME WITH DIARRHEA: ICD-10-CM

## 2024-06-28 PROBLEM — 197125005: Status: ACTIVE | Noted: 2024-06-28

## 2024-06-28 PROCEDURE — 99214 OFFICE O/P EST MOD 30 MIN: CPT | Performed by: INTERNAL MEDICINE

## 2024-06-28 RX ORDER — PREDNISOLONE 5 MG/1
1 TABLET IN THE MORNING WITH FOOD OR MILK TABLET ORAL ONCE A DAY
Status: ACTIVE | COMMUNITY

## 2024-06-28 NOTE — HPI-TODAY'S VISIT:
Ms. Thalia Biggs is a 57 yo F presenting for followup visit for abdominal pain. Carries MSH6 variant. Last visit on 4/19/24 with me.   //Abdominal bloating and excessive gas: tried probiotics, somewhat helpful. She has had BMs daily, can be oily. She stopped meat, changed to oatmilk. Gaining weight. Having a lot of abdominal distension.   //MSH6/fam hx colon cancer: due this year for colonoscopy  6/27/23:   Having every 1-2 year colonoscopy and every 2-4 year EGD for MSH6 variant and family history of GI cancer.  No known unintentional weight loss.   Has BMs daily or every other day.   Sister- colon cancer- age 50s Father: pancreatic cancer age 83   I reviewed:  7/3/23 EGD: normal- mild gastritis 7/3/23 colonoscopy: normal- next due in [id number] 9/13/2021 colonoscopy: hemorrhoids 6/17/2020 EUS for epigastric pain, chronic pancreatitis: normal-appearing pancreas, hiatal hernia, erythema in stomach/duodenum- biopsied 6/17/2020 EUS path: mild chronic inflammation

## 2024-07-01 ENCOUNTER — TELEPHONE ENCOUNTER (OUTPATIENT)
Dept: URBAN - METROPOLITAN AREA CLINIC 98 | Facility: CLINIC | Age: 59
End: 2024-07-01

## 2024-07-05 ENCOUNTER — P2P PATIENT RECORD (OUTPATIENT)
Age: 59
End: 2024-07-05

## 2024-07-09 ENCOUNTER — TELEPHONE ENCOUNTER (OUTPATIENT)
Dept: URBAN - METROPOLITAN AREA CLINIC 98 | Facility: CLINIC | Age: 59
End: 2024-07-09

## 2024-07-23 ENCOUNTER — LAB OUTSIDE AN ENCOUNTER (OUTPATIENT)
Dept: URBAN - METROPOLITAN AREA CLINIC 98 | Facility: CLINIC | Age: 59
End: 2024-07-23

## 2024-07-23 LAB
CREATININE POC: 0.8
PERFORMING LAB: (no result)

## 2024-07-24 ENCOUNTER — WEB ENCOUNTER (OUTPATIENT)
Dept: URBAN - METROPOLITAN AREA CLINIC 98 | Facility: CLINIC | Age: 59
End: 2024-07-24

## 2024-08-26 ENCOUNTER — LAB OUTSIDE AN ENCOUNTER (OUTPATIENT)
Dept: URBAN - METROPOLITAN AREA CLINIC 98 | Facility: CLINIC | Age: 59
End: 2024-08-26

## 2024-08-26 ENCOUNTER — OFFICE VISIT (OUTPATIENT)
Dept: URBAN - METROPOLITAN AREA CLINIC 98 | Facility: CLINIC | Age: 59
End: 2024-08-26
Payer: COMMERCIAL

## 2024-08-26 VITALS
HEART RATE: 79 BPM | BODY MASS INDEX: 21.66 KG/M2 | HEIGHT: 67 IN | TEMPERATURE: 97.4 F | SYSTOLIC BLOOD PRESSURE: 114 MMHG | DIASTOLIC BLOOD PRESSURE: 73 MMHG | WEIGHT: 138 LBS

## 2024-08-26 DIAGNOSIS — K92.1 HEMATOCHEZIA: ICD-10-CM

## 2024-08-26 DIAGNOSIS — Z80.0 FAMILY HISTORY OF COLON CANCER: ICD-10-CM

## 2024-08-26 DIAGNOSIS — K58.0 IRRITABLE BOWEL SYNDROME WITH DIARRHEA: ICD-10-CM

## 2024-08-26 DIAGNOSIS — R14.0 ABDOMINAL BLOATING: ICD-10-CM

## 2024-08-26 DIAGNOSIS — R14.3 EXCESSIVE GAS: ICD-10-CM

## 2024-08-26 DIAGNOSIS — Z12.11 COLON CANCER SCREENING: ICD-10-CM

## 2024-08-26 PROCEDURE — 99214 OFFICE O/P EST MOD 30 MIN: CPT | Performed by: INTERNAL MEDICINE

## 2024-08-26 RX ORDER — PREDNISOLONE 5 MG/1
1 TABLET IN THE MORNING WITH FOOD OR MILK TABLET ORAL ONCE A DAY
Status: ON HOLD | COMMUNITY

## 2024-08-26 NOTE — HPI-TODAY'S VISIT:
Ms. Thalia Biggs is a 59 yo F presenting for followup visit for abdominal pain. Carries MSH6 variant. Last visit on 6/28/24 with me.   //Abdominal bloating and excessive gas: Tried Xifaxan, got a little constipated but bloating was much improved. Modified her diet to limit triggers, added sugars, added salt, dairy. Last week she had 1 BM with mucus and a tinge of pink blood on the toilet paper after second BM of the day. No rectal pain or abdominal pain. Has had multiple BMs since then with no signs of bleeding. Stable weight.   //MSH6/fam hx colon cancer: due this year for colonoscopy.   6/27/23:   Having every 1-2 year colonoscopy and every 2-4 year EGD for MSH6 variant and family history of GI cancer.  No known unintentional weight loss.   Has BMs daily or every other day.   Sister- colon cancer- age 50s Father: pancreatic cancer age 83   I reviewed:  7/3/24 fecal jong: normal 7/3/24 panc elastase:normal 7/3/24 food allergy testing: + peanut, soybeen, corn, sesame seed 7/23/24 CT A/P: no acute findings 7/3/23 EGD: normal- mild gastritis 7/3/23 colonoscopy: normal- next due in [id number] 9/13/2021 colonoscopy: hemorrhoids 6/17/2020 EUS for epigastric pain, chronic pancreatitis: normal-appearing pancreas, hiatal hernia, erythema in stomach/duodenum- biopsied 6/17/2020 EUS path: mild chronic inflammation

## 2024-09-13 ENCOUNTER — OFFICE VISIT (OUTPATIENT)
Dept: URBAN - METROPOLITAN AREA TELEHEALTH 2 | Facility: TELEHEALTH | Age: 59
End: 2024-09-13

## 2024-09-13 RX ORDER — PREDNISOLONE 5 MG/1
1 TABLET IN THE MORNING WITH FOOD OR MILK TABLET ORAL ONCE A DAY
Status: ON HOLD | COMMUNITY

## 2024-09-27 ENCOUNTER — OFFICE VISIT (OUTPATIENT)
Dept: URBAN - METROPOLITAN AREA TELEHEALTH 2 | Facility: TELEHEALTH | Age: 59
End: 2024-09-27

## 2025-02-19 ENCOUNTER — TELEPHONE ENCOUNTER (OUTPATIENT)
Dept: URBAN - METROPOLITAN AREA CLINIC 96 | Facility: CLINIC | Age: 60
End: 2025-02-19

## 2025-05-13 ENCOUNTER — CLAIMS CREATED FROM THE CLAIM WINDOW (OUTPATIENT)
Dept: URBAN - METROPOLITAN AREA CLINIC 4 | Facility: CLINIC | Age: 60
End: 2025-05-13
Payer: COMMERCIAL

## 2025-05-13 ENCOUNTER — OFFICE VISIT (OUTPATIENT)
Dept: URBAN - METROPOLITAN AREA SURGERY CENTER 18 | Facility: SURGERY CENTER | Age: 60
End: 2025-05-13
Payer: COMMERCIAL

## 2025-05-13 DIAGNOSIS — Z86.0100 PERSONAL HISTORY OF COLONIC POLYPS: ICD-10-CM

## 2025-05-13 DIAGNOSIS — D12.0 ADENOMATOUS POLYP OF CECUM: ICD-10-CM

## 2025-05-13 DIAGNOSIS — Z86.0100 HISTORY OF COLON POLYPS: ICD-10-CM

## 2025-05-13 DIAGNOSIS — D12.4 ADENOMATOUS POLYP OF DESCENDING COLON: ICD-10-CM

## 2025-05-13 DIAGNOSIS — K63.5 POLYP OF COLON: ICD-10-CM

## 2025-05-13 DIAGNOSIS — K63.5 BENIGN COLON POLYP: ICD-10-CM

## 2025-05-13 DIAGNOSIS — K63.89 OTHER SPECIFIED DISEASES OF INTESTINE: ICD-10-CM

## 2025-05-13 DIAGNOSIS — D12.3 BENIGN NEOPLASM OF TRANSVERSE COLON: ICD-10-CM

## 2025-05-13 DIAGNOSIS — Z12.11 COLON CANCER SCREENING (HIGH RISK): ICD-10-CM

## 2025-05-13 DIAGNOSIS — Z86.0100 PERSONAL HISTORY OF COLON POLYPS, UNSPECIFIED: ICD-10-CM

## 2025-05-13 DIAGNOSIS — D12.3 ADENOMATOUS POLYP OF TRANSVERSE COLON: ICD-10-CM

## 2025-05-13 PROCEDURE — 00812 ANES LWR INTST SCR COLSC: CPT | Performed by: NURSE ANESTHETIST, CERTIFIED REGISTERED

## 2025-05-13 PROCEDURE — 45380 COLONOSCOPY AND BIOPSY: CPT | Performed by: INTERNAL MEDICINE

## 2025-05-13 PROCEDURE — 45385 COLONOSCOPY W/LESION REMOVAL: CPT | Performed by: INTERNAL MEDICINE

## 2025-05-13 PROCEDURE — 88305 TISSUE EXAM BY PATHOLOGIST: CPT | Performed by: PATHOLOGY

## 2025-05-13 RX ORDER — PREDNISOLONE 5 MG/1
1 TABLET IN THE MORNING WITH FOOD OR MILK TABLET ORAL ONCE A DAY
Status: ON HOLD | COMMUNITY